# Patient Record
Sex: FEMALE | Race: BLACK OR AFRICAN AMERICAN
[De-identification: names, ages, dates, MRNs, and addresses within clinical notes are randomized per-mention and may not be internally consistent; named-entity substitution may affect disease eponyms.]

---

## 2019-11-11 ENCOUNTER — HOSPITAL ENCOUNTER (EMERGENCY)
Dept: HOSPITAL 74 - JER | Age: 34
Discharge: LEFT BEFORE DISCHARGE | End: 2019-11-11
Payer: SELF-PAY

## 2019-11-11 VITALS — BODY MASS INDEX: 29.8 KG/M2

## 2019-11-11 VITALS — SYSTOLIC BLOOD PRESSURE: 111 MMHG | DIASTOLIC BLOOD PRESSURE: 65 MMHG | HEART RATE: 75 BPM

## 2019-11-11 VITALS — TEMPERATURE: 97.8 F

## 2019-11-11 DIAGNOSIS — Z87.09: ICD-10-CM

## 2019-11-11 DIAGNOSIS — Z3A.01: ICD-10-CM

## 2019-11-11 DIAGNOSIS — R06.02: ICD-10-CM

## 2019-11-11 DIAGNOSIS — O26.891: Primary | ICD-10-CM

## 2019-11-11 LAB
ALBUMIN SERPL-MCNC: 3.7 G/DL (ref 3.4–5)
ALP SERPL-CCNC: 50 U/L (ref 45–117)
ALT SERPL-CCNC: 17 U/L (ref 13–61)
ANION GAP SERPL CALC-SCNC: 6 MMOL/L (ref 8–16)
APPEARANCE UR: CLEAR
AST SERPL-CCNC: 10 U/L (ref 15–37)
BASOPHILS # BLD: 0.7 % (ref 0–2)
BILIRUB SERPL-MCNC: 0.3 MG/DL (ref 0.2–1)
BILIRUB UR STRIP.AUTO-MCNC: NEGATIVE MG/DL
BUN SERPL-MCNC: 4.9 MG/DL (ref 7–18)
CALCIUM SERPL-MCNC: 9.4 MG/DL (ref 8.5–10.1)
CHLORIDE SERPL-SCNC: 103 MMOL/L (ref 98–107)
CO2 SERPL-SCNC: 25 MMOL/L (ref 21–32)
COLOR UR: (no result)
CREAT SERPL-MCNC: 0.6 MG/DL (ref 0.55–1.3)
DEPRECATED RDW RBC AUTO: 13.9 % (ref 11.6–15.6)
EOSINOPHIL # BLD: 1 % (ref 0–4.5)
GLUCOSE SERPL-MCNC: 77 MG/DL (ref 74–106)
HCT VFR BLD CALC: 38.8 % (ref 32.4–45.2)
HGB BLD-MCNC: 12.7 GM/DL (ref 10.7–15.3)
INR BLD: 1.02 (ref 0.83–1.09)
KETONES UR QL STRIP: (no result)
LEUKOCYTE ESTERASE UR QL STRIP.AUTO: NEGATIVE
LYMPHOCYTES # BLD: 38.3 % (ref 8–40)
MCH RBC QN AUTO: 28.4 PG (ref 25.7–33.7)
MCHC RBC AUTO-ENTMCNC: 32.8 G/DL (ref 32–36)
MCV RBC: 86.4 FL (ref 80–96)
MONOCYTES # BLD AUTO: 10.7 % (ref 3.8–10.2)
NEUTROPHILS # BLD: 49.3 % (ref 42.8–82.8)
NITRITE UR QL STRIP: NEGATIVE
PH UR: 6 [PH] (ref 5–8)
PLATELET # BLD AUTO: 297 K/MM3 (ref 134–434)
PMV BLD: 7.7 FL (ref 7.5–11.1)
POTASSIUM SERPLBLD-SCNC: 3.9 MMOL/L (ref 3.5–5.1)
PROT SERPL-MCNC: 7.8 G/DL (ref 6.4–8.2)
PROT UR QL STRIP: (no result)
PROT UR QL STRIP: NEGATIVE
PT PNL PPP: 12 SEC (ref 9.7–13)
RBC # BLD AUTO: 4.49 M/MM3 (ref 3.6–5.2)
SODIUM SERPL-SCNC: 134 MMOL/L (ref 136–145)
SP GR UR: 1.03 (ref 1.01–1.03)
UROBILINOGEN UR STRIP-MCNC: 1 MG/DL (ref 0.2–1)
WBC # BLD AUTO: 3.7 K/MM3 (ref 4–10)

## 2019-11-11 PROCEDURE — 3E0F7GC INTRODUCTION OF OTHER THERAPEUTIC SUBSTANCE INTO RESPIRATORY TRACT, VIA NATURAL OR ARTIFICIAL OPENING: ICD-10-PCS

## 2019-11-11 NOTE — PDOC
Rapid Medical Evaluation


Time Seen by Provider: 19 14:23


Medical Evaluation: 





19 14:23


The patient is a 35 y/o F  who presents for shortness of breath for two 

weeks. Hx of asthma. LMP 19. Recently emigrated from [facility].


Exam: lungs CTAB


Orders: labs, IV, urine


Pt to proceed to the ER for further evaluation





**Discharge Disposition





- Diagnosis


 Shortness of breath








- Referrals





- Patient Instructions





- Post Discharge Activity

## 2019-11-11 NOTE — PDOC
*Physical Exam





- Vital Signs


 Last Vital Signs











Temp Pulse Resp BP Pulse Ox


 


 97.8 F   79   16   106/59 L  100 


 


 11/11/19 14:19  11/11/19 14:19  11/11/19 14:19  11/11/19 14:19  11/11/19 15:55














ED Treatment Course





- LABORATORY


CBC & Chemistry Diagram: 


 11/11/19 16:35





 11/11/19 16:15





- ADDITIONAL ORDERS


Additional order review: 


 Laboratory  Results











  11/11/19 11/11/19 11/11/19





  16:35 16:15 16:05


 


PT with INR   


 


INR   


 


D-Dimer    6643 H


 


Sodium   134 L 


 


Potassium   3.9 


 


Chloride   103 


 


Carbon Dioxide   25 


 


Anion Gap   6 L 


 


BUN   4.9 L 


 


Creatinine   0.6 


 


Est GFR (CKD-EPI)AfAm   136.87 


 


Est GFR (CKD-EPI)NonAf   118.09 


 


Random Glucose   77 


 


Calcium   9.4 


 


Total Bilirubin   0.3 


 


AST   10 L 


 


ALT   17 


 


Alkaline Phosphatase   50 


 


Total Protein   7.8 


 


Albumin   3.7 


 


Beta HCG, Quant   


 


Urine Color   


 


Urine Appearance   


 


Urine pH   


 


Ur Specific Gravity   


 


Urine Protein   


 


Urine Glucose (UA)   


 


Urine Ketones   


 


Urine Blood   


 


Urine Nitrite   


 


Urine Bilirubin   


 


Urine Urobilinogen   


 


Ur Leukocyte Esterase   


 


Blood Type  O POSITIVE  


 


Antibody Screen  Negative  














  11/11/19 11/11/19 11/11/19





  16:05 16:05 16:05


 


PT with INR   12.00 


 


INR   1.02 


 


D-Dimer   


 


Sodium   


 


Potassium   


 


Chloride   


 


Carbon Dioxide   


 


Anion Gap   


 


BUN   


 


Creatinine   


 


Est GFR (CKD-EPI)AfAm   


 


Est GFR (CKD-EPI)NonAf   


 


Random Glucose   


 


Calcium   


 


Total Bilirubin   


 


AST   


 


ALT   


 


Alkaline Phosphatase   


 


Total Protein   


 


Albumin   


 


Beta HCG, Quant    86128.8


 


Urine Color  Dk yellow  


 


Urine Appearance  Clear  


 


Urine pH  6.0  


 


Ur Specific Gravity  1.029  


 


Urine Protein  1+ H  


 


Urine Glucose (UA)  Negative  


 


Urine Ketones  1+ H  


 


Urine Blood  Negative  


 


Urine Nitrite  Negative  


 


Urine Bilirubin  Negative  


 


Urine Urobilinogen  1.0  


 


Ur Leukocyte Esterase  Negative  


 


Blood Type   


 


Antibody Screen   








 











  11/11/19





  16:35


 


RBC  4.49


 


MCV  86.4


 


MCHC  32.8


 


RDW  13.9


 


MPV  7.7


 


Neutrophils %  49.3


 


Lymphocytes %  38.3


 


Monocytes %  10.7 H


 


Eosinophils %  1.0


 


Basophils %  0.7














- Medications


Given in the ED: 


ED Medications














Discontinued Medications














Generic Name Dose Route Start Last Admin





  Trade Name Freq  PRN Reason Stop Dose Admin


 


Albuterol/Ipratropium  1 amp  11/11/19 15:47  11/11/19 16:20





  Duoneb -  NEB  11/11/19 15:48  1 amp





  ONCE ONE   Administration





     





     





     





     














ED Progress Note





- Progress Note


Progress Note: 





11/11/19 20:40


Received patient from nurse practitioner Elie.





Briefly this a 34-year-old woman who has had 4 to 5 days of shortness of 

breath.  Patient recently traveled from [facility] and is currently 7 weeks pregnant.





Patient with a d-dimer of 6643.


Duplex Dopplers of bilateral lower extremities revealed no DVT.





CTA of the chest is pending to rule out pulmonary embolism. 


11/11/19 22:13








Medical Decision Making





- Medical Decision Making





11/11/19 22:13


Consent discussed with patient who refuses to sign over concerns for the child.

  Patient was aware of risks of CAT scan on the child versus pulmonary 

embolism.  Patient is adamant not to receive IV contrast and CAT scan at this 

time.  Case has been discussed with Dr. Miner of OB/GYN.  He recommends 

deferring treatment until patient has a known study.


Admission was discussed with the patient's risk reevaluated by pulmonology and 

so she could think about CTA study and potentially decide to have the scan.  

Patient refuses admission at this time.  Case has been discussed with attending 

physician Harrison who is aware that the patient will leave the hospital AGAINST 

MEDICAL ADVICE.





The patient has requested to leave the ED against medical advice. Pt did not 

want CTA or admission. 





I believe this patient is of sound mind and competent to refuse medical care. 

The patient is responding and asking questions appropriately. The patient is 

oriented to person, place and time. The patient is not psychotic, delusional, 

suicidal, homicidal or hallucinating. The patient demonstrates a normal mental 

capacity to make decisions regarding their healthcare. The patient is 

clinically sober and does not appear to be under the influence of any illicit 

drugs at this time. The patient has been advised of the risks, in layman terms, 

of leaving AMA which include, but are not limited to: cardiac arrest, pulmonary 

infarct, myocardial infarction, arrhythmia, stroke, fetal demise, respiratory 

failure, coma, severe permanent disability, loss of current lifestyle, delay in 

diagnosis and death.





Alternatives have been offered - the patient remains steadfast in their wish to 

leave. The patient has been advised that should they change their mind they are 

welcome to return to this hospital, or any other, at any time. The patient 

understands that in no way does an AMA discharge mean that I do not want them 

to have the best medical care available. To this end, I have provided 

appropriate prescriptions, referrals, and discharge instructions. The patient 

did sign AMA paperwork. The above discussion was witnessed by another member of 

staff, LEOPOLDO Cervantes.





Discharge





- Discharge Information


Problems reviewed: Yes


Clinical Impression/Diagnosis: 


 Shortness of breath





Condition: Fair


Disposition: AGAINST MEDICAL ADVICE





- Admission


No





- Follow up/Referral


Referrals: 


Juan Miner MD [Staff Physician] - 


Vitor Ron MD [Staff Physician] - 





- Patient Discharge Instructions


Additional Instructions: 


You are leaving the hospital AGAINST MEDICAL ADVICE.


The medical plan for you was to have a CAT scan of your chest with IV contrast 

to rule out a blood clot in your lungs.


To see her pregnancy you refused this testing at this time.


Admission was offered you to be evaluated by a pulmonologist. You refused this 

plan also.


Leaving AGAINST MEDICAL ADVICE you accept all risks and responsibilities of 

complications due to shortness of breath and pregnancy.


You been given the names of an OB/GYN and a pulmonologist to follow-up with as 

an outpatient.


If any of your symptoms worsen, you are more than welcome to return to the 

emergency department for continued evaluation.  You are leaving AGAINST MEDICAL 

ADVICE will not be held against you and we would welcome you to return for 

continued evaluation.





- Post Discharge Activity

## 2019-11-11 NOTE — PDOC
History of Present Illness





- General


Chief Complaint: Shortness of Breath


Stated Complaint: SHORTNESS OF BREATH


Time Seen by Provider: 11/11/19 14:23


History Source: Patient


Exam Limitations: No Limitations





- History of Present Illness


Initial Comments: 





11/11/19 15:56


34-year-old female currently 7 weeks pregnant based on LMP presents to ED with 

increasing shortness of breath of the past 2 weeks.  Patient states recent 

travel from [facility] 30 days ago and has not seen a gynecologist or PCP since 

onset.  Patient states has history of asthma but has not been wheezing or 

coughing.  Patient states difficulty taking a deep breath and denies fever, 

chills chest pain or palpitations.  Patient states shortness of breath is 

constant. 


Is this a multiple visit Asthma Patient?: No


Timing/Duration: reports: other


Severity: reports: moderate


Associated Symptoms: reports: shortness of breath





Past History





- Travel


Traveled outside of the country in the last 30 days: No


Close contact w/someone who was outside of country & ill: No





- Past Medical History


Allergies/Adverse Reactions: 


 Allergies











Allergy/AdvReac Type Severity Reaction Status Date / Time


 


No Known Allergies Allergy   Verified 11/11/19 14:26











Asthma: Yes


COPD: No





- Psycho Social/Smoking Cessation Hx


Smoking History: Never smoked


Patient Lives Alone: No


Lives with/in: spouse/SO





Respiratory Specific PMHX





- Complaint Specific PMHX


Hx Asthma: Yes





**Review of Systems





- Review of Systems


Able to Perform ROS?: Yes


Constitutional: No: Symptoms Reported


HEENTM: No: Symptoms Reported


Respiratory: Yes: Shortness of Breath


Cardiac (ROS): No: Symptoms Reported


ABD/GI: No: Symptoms Reported


: No: Symptoms Reported


Musculoskeletal: No: Symptoms Reported


Integumentary: No: Symptoms Reported


Neurological: No: Symptoms reported


Endocrine: No: Symptoms Reported


Hematologic/Lymphatic: No: Symptoms Reported





*Physical Exam





- Vital Signs


 Last Vital Signs











Temp Pulse Resp BP Pulse Ox


 


 97.8 F   79   16   106/59 L  100 


 


 11/11/19 14:19  11/11/19 14:19  11/11/19 14:19  11/11/19 14:19  11/11/19 14:19














- Physical Exam


General Appearance: Yes: Nourished, Appropriately Dressed.  No: Apparent 

Distress


HEENT: negative: Pale Conjunctivae


Neck: positive: Normal Thyroid


Respiratory/Chest: positive: Lungs Clear, Decreased Breath Sounds (At bases 

with inspiration).  negative: Chest Tender, Respiratory Distress, Accessory 

Muscle Use


Cardiovascular: positive: Regular Rhythm, Regular Rate.  negative: Murmur


Extremity: positive: Normal Inspection


Integumentary: positive: Normal Color, Warm, Moist


Neurologic: positive: Motor Strength 5/5 (Ambulatory )





**Heart Score/ECG Review





- ECG Intrepretation


Rhythm: Regular Rhythm (rate 73, normal sinus rhythm.  No ST elevation or 

depression.)





ED Treatment Course





- LABORATORY


CBC & Chemistry Diagram: 


 11/11/19 16:35





 11/11/19 16:15





Medical Decision Making





- Medical Decision Making





11/11/19 15:59


Chief complaint: Shortness of breath consistently for the past 2 weeks.  

Patient with recent travel to [facility] 4 weeks ago and is currently 7 weeks 

pregnant.  Patient denies clotting disorders but states history of asthma.  

Patient does not have complaints of wheezing or cough


Exam: Patient decreased breath sounds at bases with inspiration, vital signs 

stable


Plan: Labs, EKG, urine, and d-dimer


11/11/19 18:03


 Selected Entries











  11/11/19





  14:19


 


Temperature 97.8 F


 


Pulse Rate 79


 


Respiratory 16





Rate 


 


Blood Pressure 106/59 L


 


Blood Pressure 74





Mean 


 


O2 Sat by Pulse 100





Oximetry (%) 


 


Weight 83.915 kg








 Laboratory Tests











  11/11/19 11/11/19 11/11/19





  16:05 16:05 16:05


 


WBC   


 


Hgb   


 


Hct   


 


Absolute Neuts (auto)   


 


Neutrophils %   


 


Monocytes %   


 


PT with INR   12.00 


 


INR   1.02 


 


D-Dimer    6643 H


 


Sodium   


 


Potassium   


 


Chloride   


 


Carbon Dioxide   


 


Anion Gap   


 


BUN   


 


Creatinine   


 


Est GFR (CKD-EPI)AfAm   


 


Est GFR (CKD-EPI)NonAf   


 


Random Glucose   


 


Calcium   


 


Total Bilirubin   


 


AST   


 


ALT   


 


Alkaline Phosphatase   


 


Total Protein   


 


Albumin   


 


Beta HCG, Quant  98162.8  














  11/11/19 11/11/19





  16:15 16:35


 


WBC   3.7 L


 


Hgb   12.7


 


Hct   38.8


 


Absolute Neuts (auto)   1.8


 


Neutrophils %   49.3


 


Monocytes %   10.7 H


 


PT with INR  


 


INR  


 


D-Dimer  


 


Sodium  134 L 


 


Potassium  3.9 


 


Chloride  103 


 


Carbon Dioxide  25 


 


Anion Gap  6 L 


 


BUN  4.9 L 


 


Creatinine  0.6 


 


Est GFR (CKD-EPI)AfAm  136.87 


 


Est GFR (CKD-EPI)NonAf  118.09 


 


Random Glucose  77 


 


Calcium  9.4 


 


Total Bilirubin  0.3 


 


AST  10 L 


 


ALT  17 


 


Alkaline Phosphatase  50 


 


Total Protein  7.8 


 


Albumin  3.7 


 


Beta HCG, Quant  








 Patient concerning for PE.  Patient ordered for duplex. If positive may start 

empirical treatment with heparin. 


11/11/19 18:16








Discharge





- Discharge Information


Problems reviewed: Yes


Clinical Impression/Diagnosis: 


 Shortness of breath





Condition: Fair


Disposition: AGAINST MEDICAL ADVICE





- Follow up/Referral


Referrals: 


Vitor Ron MD [Staff Physician] - 


Juan Miner MD [Staff Physician] - 





- Patient Discharge Instructions


Additional Instructions: 


You are leaving the hospital AGAINST MEDICAL ADVICE.


The medical plan for you was to have a CAT scan of your chest with IV contrast 

to rule out a blood clot in your lungs.


To see her pregnancy you refused this testing at this time.


Admission was offered you to be evaluated by a pulmonologist. You refused this 

plan also.


Leaving AGAINST MEDICAL ADVICE you accept all risks and responsibilities of 

complications due to shortness of breath and pregnancy.


You been given the names of an OB/GYN and a pulmonologist to follow-up with as 

an outpatient.


If any of your symptoms worsen, you are more than welcome to return to the 

emergency department for continued evaluation.  You are leaving AGAINST MEDICAL 

ADVICE will not be held against you and we would welcome you to return for 

continued evaluation.





- Post Discharge Activity

## 2020-07-09 ENCOUNTER — HOSPITAL ENCOUNTER (INPATIENT)
Dept: HOSPITAL 74 - JLDR | Age: 35
LOS: 5 days | Discharge: HOME | DRG: 540 | End: 2020-07-14
Attending: OBSTETRICS & GYNECOLOGY | Admitting: OBSTETRICS & GYNECOLOGY
Payer: COMMERCIAL

## 2020-07-09 VITALS — BODY MASS INDEX: 34.3 KG/M2

## 2020-07-09 DIAGNOSIS — O48.0: Primary | ICD-10-CM

## 2020-07-09 DIAGNOSIS — O34.211: ICD-10-CM

## 2020-07-09 DIAGNOSIS — Z3A.41: ICD-10-CM

## 2020-07-09 DIAGNOSIS — O61.0: ICD-10-CM

## 2020-07-09 LAB
ANION GAP SERPL CALC-SCNC: 9 MMOL/L (ref 8–16)
APTT BLD: 27 SECONDS (ref 25.2–36.5)
BASOPHILS # BLD: 0.3 % (ref 0–2)
BUN SERPL-MCNC: 4.1 MG/DL (ref 7–18)
CALCIUM SERPL-MCNC: 8.6 MG/DL (ref 8.5–10.1)
CHLORIDE SERPL-SCNC: 106 MMOL/L (ref 98–107)
CO2 SERPL-SCNC: 23 MMOL/L (ref 21–32)
CREAT SERPL-MCNC: 0.5 MG/DL (ref 0.55–1.3)
DEPRECATED RDW RBC AUTO: 14.1 % (ref 11.6–15.6)
EOSINOPHIL # BLD: 1 % (ref 0–4.5)
GLUCOSE SERPL-MCNC: 94 MG/DL (ref 74–106)
HCT VFR BLD CALC: 35.6 % (ref 32.4–45.2)
HGB BLD-MCNC: 11.8 GM/DL (ref 10.7–15.3)
INR BLD: 0.94 (ref 0.83–1.09)
LYMPHOCYTES # BLD: 32.3 % (ref 8–40)
MCH RBC QN AUTO: 30.2 PG (ref 25.7–33.7)
MCHC RBC AUTO-ENTMCNC: 33.3 G/DL (ref 32–36)
MCV RBC: 90.9 FL (ref 80–96)
MONOCYTES # BLD AUTO: 9.2 % (ref 3.8–10.2)
NEUTROPHILS # BLD: 57.2 % (ref 42.8–82.8)
PLATELET # BLD AUTO: 265 K/MM3 (ref 134–434)
PMV BLD: 8.3 FL (ref 7.5–11.1)
POTASSIUM SERPLBLD-SCNC: 3.4 MMOL/L (ref 3.5–5.1)
PT PNL PPP: 11.1 SEC (ref 9.7–13)
RBC # BLD AUTO: 3.92 M/MM3 (ref 3.6–5.2)
SODIUM SERPL-SCNC: 137 MMOL/L (ref 136–145)
WBC # BLD AUTO: 4.9 K/MM3 (ref 4–10)

## 2020-07-09 PROCEDURE — U0003 INFECTIOUS AGENT DETECTION BY NUCLEIC ACID (DNA OR RNA); SEVERE ACUTE RESPIRATORY SYNDROME CORONAVIRUS 2 (SARS-COV-2) (CORONAVIRUS DISEASE [COVID-19]), AMPLIFIED PROBE TECHNIQUE, MAKING USE OF HIGH THROUGHPUT TECHNOLOGIES AS DESCRIBED BY CMS-2020-01-R: HCPCS

## 2020-07-09 PROCEDURE — 3E0P7VZ INTRODUCTION OF HORMONE INTO FEMALE REPRODUCTIVE, VIA NATURAL OR ARTIFICIAL OPENING: ICD-10-PCS | Performed by: OBSTETRICS & GYNECOLOGY

## 2020-07-09 RX ADMIN — SODIUM CHLORIDE, SODIUM LACTATE, POTASSIUM CHLORIDE, CALCIUM CHLORIDE AND DEXTROSE MONOHYDRATE SCH MLS/HR: 5; 600; 310; 30; 20 INJECTION, SOLUTION INTRAVENOUS at 22:00

## 2020-07-09 NOTE — HP
Past Medical History





- Admission


Chief Complaint: Elective induction


History of Present Illness: 





36 yo  @ 41 weeks gestation, EDC 20, admitted for cervidil induction.


She has no complaints.


History Source: Patient


Limitations to Obtaining History: No Limitations





- Past Medical History


...: 2


...Para: 1


...EDC by Renetta: 20





- Past Surgical History


Past Surgical History: Yes: None


Hx Myomectomy: No


Hx Transabdominal Cerclage: No





- Smoking History


Smoking history: Never smoked





- Alcohol/Substance Use


Hx Alcohol Use: No


History of Substance Use: reports: None





- Social History


Usual Living Arrangement: Yes: With Spouse


History of Recent Travel: No





Home Medications





- Allergies


Allergies/Adverse Reactions: 


                                    Allergies











Allergy/AdvReac Type Severity Reaction Status Date / Time


 


No Known Allergies Allergy   Verified 20 21:21














- Home Medications


Home Medications: 


Ambulatory Orders





Ferrous Sulfate [Feosol] 325 mg PO DAILY 20 


Mv-Mn/Iron/FA/Herbal/Digestive [Prenatal One Tablet] 1 each PO DAILY 20 











Family Medical History


Family History: Unremarkable





Review of Systems





- Review of Systems


Constitutional: reports: No Symptoms


Eyes: reports: No Symptoms


HENT: reports: No Symptoms


Neck: reports: No Symptoms


Cardiovascular: reports: No Symptoms


Respiratory: reports: No Symptoms


Gastrointestinal: reports: No Symptoms


Genitourinary: reports: No Symptoms


Breasts: reports: No Symptoms Reported


Musculoskeletal: reports: No Symptoms


Integumentary: reports: No Symptoms


Neurological: reports: No Symptoms


Pain Intensity: 0





Physical Exam - Maternity


Constitutional: Yes: No Distress


Eyes: Yes: Conjunctiva Clear


HENT: Yes: Atraumatic


Neck: Yes: Supple


Cardiovascular: Yes: Regular Rate and Rhythm


Lungs: Clear to auscultation


Breast(s): Yes: WNL





- Abdominal Exam/OB


Number of Fetuses: Single


Fetal Presentation: Vertex


Contractions: No





- Vaginal Exam/OB


Vaginal Bleeding: No





- Physical Exam


Musculoskeletal: Yes: WNL


Extremities: Yes: WNL


...Motor Strength: WNL


Psychiatric: Yes: Alert, Oriented





Problem List





- Problems


(1) Post-dates pregnancy


Problems reviewed: Yes   


Code(s): O48.0 - POST-TERM PREGNANCY   


Qualifiers: 


   Post-term pregnancy type: 40-42 weeks gestation   Qualified Code(s): O48.0 - 

Post-term pregnancy   





Assessment/Plan





Postdates pregnancy


Admit for cervidil induction

## 2020-07-09 NOTE — PN
Progress Note (short form)





- Note


Progress Note: 


36 yo , EGA 41 weeks for induction of labor


VSS, afebrile


EFM - 140/min, moderate variability, accelerations, no decelerations


         [place]os - irritability


Pelvic - Closed/long/posterior/vertex


Plan - Late term gestation for cervidil ripening


         Cervidil placed


         Anticipate vaginal delivery

## 2020-07-10 PROCEDURE — 3E033VJ INTRODUCTION OF OTHER HORMONE INTO PERIPHERAL VEIN, PERCUTANEOUS APPROACH: ICD-10-PCS | Performed by: OBSTETRICS & GYNECOLOGY

## 2020-07-10 RX ADMIN — SODIUM CHLORIDE, SODIUM LACTATE, POTASSIUM CHLORIDE, CALCIUM CHLORIDE AND DEXTROSE MONOHYDRATE SCH MLS/HR: 5; 600; 310; 30; 20 INJECTION, SOLUTION INTRAVENOUS at 20:00

## 2020-07-10 RX ADMIN — SODIUM CHLORIDE, SODIUM LACTATE, POTASSIUM CHLORIDE, CALCIUM CHLORIDE AND DEXTROSE MONOHYDRATE SCH MLS/HR: 5; 600; 310; 30; 20 INJECTION, SOLUTION INTRAVENOUS at 06:00

## 2020-07-10 RX ADMIN — SODIUM CHLORIDE, SODIUM LACTATE, POTASSIUM CHLORIDE, CALCIUM CHLORIDE AND DEXTROSE MONOHYDRATE SCH MLS/HR: 5; 600; 310; 30; 20 INJECTION, SOLUTION INTRAVENOUS at 12:17

## 2020-07-10 RX ADMIN — Medication SCH MLS/HR: at 11:00

## 2020-07-11 LAB
BASE EXCESS BLDCOA CALC-SCNC: -3 MMOL/L (ref 0–2)
HCO3 BLDCO-SCNC: 24.1 MMHG (ref 20–29)
PCO2 BLDCO: 50.9 MMHG (ref 30–78)
PH BLDCO: 7.29 [PH] (ref 7.14–7.44)

## 2020-07-11 RX ADMIN — BUPIVACAINE HYDROCHLORIDE SCH ML: 7.5 INJECTION, SOLUTION EPIDURAL; RETROBULBAR at 05:20

## 2020-07-11 RX ADMIN — Medication SCH MLS/HR: at 10:45

## 2020-07-11 RX ADMIN — SODIUM CHLORIDE, SODIUM GLUCONATE, SODIUM ACETATE, POTASSIUM CHLORIDE, AND MAGNESIUM CHLORIDE SCH MLS/HR: 526; 502; 368; 37; 30 INJECTION, SOLUTION INTRAVENOUS at 05:00

## 2020-07-11 NOTE — PN
Progress Note (short form)





- Note


Progress Note: 





Patient seen and evaluated, she's lying comfortably in bed.


She's status post epidural anesthesia.





FHR : 150's, decrease variability, occasional variable decelerations.


[place] : Contractions absent


VE :  Cervix 4-5/90/-1


        


       





A/P : 


       Postdates pregnancy


       Failed medical induction


       Non-reassuring fetal heart rate


       Consent signed for primary 


       Anesthesiologist called





Problem List





- Problems


(1) Post-dates pregnancy


Code(s): O48.0 - POST-TERM PREGNANCY   


Qualifiers: 


   Post-term pregnancy type: 40-42 weeks gestation   Qualified Code(s): O48.0 - 

Post-term pregnancy

## 2020-07-11 NOTE — OP
Operative Note





- Note:


Operative Date: 20


Pre-Operative Diagnosis: Nonreassuring Fetal Heart Rate / Failed Medical 

Induction


Operation: Primary Low Transverse 


Findings: 





Baby girl in LOT position


Post-Operative Diagnosis: Same as Pre-op


Surgeon: Jessica Rowe


Assistant: Elkin Arguello


Anesthesia: Epidural


Specimens Removed: Placenta


Estimated Blood Loss (mls): 700

## 2020-07-11 NOTE — PN
Progress Note (short form)





- Note


Progress Note: 





I assisted abril Barrientos at the c/section for the entirety of the case.

## 2020-07-12 LAB
BASOPHILS # BLD: 0.3 % (ref 0–2)
DEPRECATED RDW RBC AUTO: 14.2 % (ref 11.6–15.6)
EOSINOPHIL # BLD: 0.2 % (ref 0–4.5)
HCT VFR BLD CALC: 33.4 % (ref 32.4–45.2)
HGB BLD-MCNC: 11 GM/DL (ref 10.7–15.3)
LYMPHOCYTES # BLD: 10.3 % (ref 8–40)
MCH RBC QN AUTO: 29.9 PG (ref 25.7–33.7)
MCHC RBC AUTO-ENTMCNC: 32.9 G/DL (ref 32–36)
MCV RBC: 90.7 FL (ref 80–96)
MONOCYTES # BLD AUTO: 5.9 % (ref 3.8–10.2)
NEUTROPHILS # BLD: 83.3 % (ref 42.8–82.8)
PLATELET # BLD AUTO: 240 K/MM3 (ref 134–434)
PMV BLD: 8 FL (ref 7.5–11.1)
RBC # BLD AUTO: 3.69 M/MM3 (ref 3.6–5.2)
WBC # BLD AUTO: 12.8 K/MM3 (ref 4–10)

## 2020-07-12 RX ADMIN — IBUPROFEN PRN MG: 600 TABLET, FILM COATED ORAL at 17:11

## 2020-07-12 RX ADMIN — SIMETHICONE CHEW TAB 80 MG PRN MG: 80 TABLET ORAL at 17:12

## 2020-07-12 RX ADMIN — SODIUM CHLORIDE, SODIUM LACTATE, POTASSIUM CHLORIDE, CALCIUM CHLORIDE AND DEXTROSE MONOHYDRATE SCH: 5; 600; 310; 30; 20 INJECTION, SOLUTION INTRAVENOUS at 21:03

## 2020-07-12 NOTE — PN
Post Partum Note





- Post Partum


Date of Delivery: 20


Post Partum Day: 1


Vital Signs: 


                               Vital Signs - 24 hr











  20





  00:00 01:00 02:00


 


Temperature   99.0 F


 


Pulse Rate   94 H


 


Respiratory 18 18 18





Rate   


 


Blood Pressure   105/59 L














  20





  03:00 04:00 06:00


 


Temperature   98.7 F


 


Pulse Rate   75


 


Respiratory 18 18 18





Rate   


 


Blood Pressure   99/55 L














  20





  08:00 10:00 21:35


 


Temperature  98.5 F 98.6 F


 


Pulse Rate  67 76


 


Respiratory 20 20 18





Rate   


 


Blood Pressure  107/68 110/67











Labs: 


                         Laboratory Results - last 24 hr











  20





  07:40


 


WBC  12.8 H


 


RBC  3.69


 


Hgb  11.0


 


Hct  33.4


 


MCV  90.7


 


MCH  29.9


 


MCHC  32.9


 


RDW  14.2


 


Plt Count  240


 


MPV  8.0


 


Absolute Neuts (auto)  10.7 H


 


Neutrophils %  83.3 H D


 


Lymphocytes %  10.3  D


 


Monocytes %  5.9


 


Eosinophils %  0.2


 


Basophils %  0.3


 


Nucleated RBC %  0














- Subjective


Subjective: No Complaints





- Objective


Afebrile: Yes


Breast: Not engorged


Abdomen: Soft, Non-tender, Other (+ dressing dry)


Uterus: Fundus firm


Vagina: Scant lochia


Extremities: Non-tender





- Assessment/Plan


(1) History of low transverse  section


Assessment: Other (POD1)   Plan: Routine Care

## 2020-07-13 RX ADMIN — Medication SCH: at 18:56

## 2020-07-13 RX ADMIN — SODIUM CHLORIDE, SODIUM GLUCONATE, SODIUM ACETATE, POTASSIUM CHLORIDE, AND MAGNESIUM CHLORIDE SCH: 526; 502; 368; 37; 30 INJECTION, SOLUTION INTRAVENOUS at 18:56

## 2020-07-13 RX ADMIN — Medication SCH: at 19:26

## 2020-07-13 RX ADMIN — BUPIVACAINE HYDROCHLORIDE SCH: 7.5 INJECTION, SOLUTION EPIDURAL; RETROBULBAR at 18:56

## 2020-07-13 RX ADMIN — SIMETHICONE CHEW TAB 80 MG PRN MG: 80 TABLET ORAL at 13:37

## 2020-07-13 RX ADMIN — IBUPROFEN PRN MG: 600 TABLET, FILM COATED ORAL at 05:52

## 2020-07-13 RX ADMIN — SIMETHICONE CHEW TAB 80 MG PRN MG: 80 TABLET ORAL at 20:23

## 2020-07-13 RX ADMIN — SIMETHICONE CHEW TAB 80 MG PRN MG: 80 TABLET ORAL at 05:53

## 2020-07-13 RX ADMIN — Medication SCH: at 19:27

## 2020-07-13 RX ADMIN — BUPIVACAINE HYDROCHLORIDE SCH: 7.5 INJECTION, SOLUTION EPIDURAL; RETROBULBAR at 15:40

## 2020-07-13 RX ADMIN — Medication SCH: at 15:40

## 2020-07-13 RX ADMIN — IBUPROFEN PRN MG: 600 TABLET, FILM COATED ORAL at 20:22

## 2020-07-13 RX ADMIN — SODIUM CHLORIDE, SODIUM LACTATE, POTASSIUM CHLORIDE, CALCIUM CHLORIDE AND DEXTROSE MONOHYDRATE SCH: 5; 600; 310; 30; 20 INJECTION, SOLUTION INTRAVENOUS at 18:56

## 2020-07-13 RX ADMIN — IBUPROFEN PRN MG: 600 TABLET, FILM COATED ORAL at 13:37

## 2020-07-13 RX ADMIN — SODIUM CHLORIDE, SODIUM GLUCONATE, SODIUM ACETATE, POTASSIUM CHLORIDE, AND MAGNESIUM CHLORIDE SCH: 526; 502; 368; 37; 30 INJECTION, SOLUTION INTRAVENOUS at 15:40

## 2020-07-13 NOTE — PN
Post Partum Note





- Post Partum


Date of Delivery: 20


Post Partum Day: 2


Vital Signs: 


                               Vital Signs - 24 hr











  20





  10:00 21:35


 


Temperature 98.5 F 98.6 F


 


Pulse Rate 67 76


 


Respiratory 20 18





Rate  


 


Blood Pressure 107/68 110/67














- Subjective


Subjective: No Complaints





- Objective


Afebrile: Yes


Breast: Not engorged


Abdomen: Soft, Non-tender


Uterus: Fundus firm


Vagina: Scant lochia


Extremities: Non-tender





- Assessment/Plan


(1) History of low transverse  section


Assessment: Other (SP CS POD2)   Plan: Routine Care

## 2020-07-13 NOTE — OP
DATE OF OPERATION:  2020

 

PREOPERATIVE DIAGNOSIS:  Nonreassuring fetal heart rate, failed medical induction at

41 weeks' gestation.

 

POSTOPERATIVE DIAGNOSIS:  Nonreassuring fetal heart rate, failed medical induction at

41 weeks' gestation.

 

PROCEDURE:  Primary low transverse  section.

 

SURGEON:  Jessica Rowe MD

 

ASSISTANT:  Elkin Arguello MD 

 

ANESTHESIA:  Epidural.  

 

COMPLICATIONS:  None.

 

ESTIMATED BLOOD LOSS:  700 mL.  

 

DESCRIPTION OF PROCEDURE:  Patient was taken to the operating room where epidural

anesthesia was found to be adequate.  Patient was then prepped and draped in proper

sterile fashion.  A Pfannenstiel skin incision was then made and carried down through

the underlying layer of fascia.  The fascia was incised in the midline and extended

laterally.  The superior aspect of the fascial incision was then grasped with a

Kocher clamp, elevated, and the rectus muscle dissected off bluntly.  Attention was

then turned to the inferior aspect of the fascial incision, which in a similar

fashion was then grasped with a Kocher clamp, elevated, and the rectus muscle

dissected off bluntly.  The rectus muscle was then  in the midline, the

peritoneum identified and entered sharply with the Metzenbaum scissors.  The

peritoneal incision was extended superiorly and inferiorly, with good visualization

of the bladder, the vesicouterine peritoneum was then entered using the Metzenbaum

scissors.  This incision was extended laterally and a bladder flap created digitally.

 The bladder blade was inserted, and the lower uterine segment was incised using a

10-blade.  This incision was extended laterally and the fetal head delivered

atraumatically.  Nose and mouth were suctioned and the cord clamped and cut.  The

infant was handed to the waiting neonatologist.  The placenta was removed manually. 

The uterus was exteriorized and cleared of all clots and debris.  The uterine

incision was repaired using 0 Biosyn in a running locked fashion.  A second layer of

the same suture was used as a means to provide excellent hemostasis, and the pelvis

was then completely irrigated.  The uterus was returned to the abdomen.  Then the

peritoneum was closed using 2-0 Biosyn.  The fascia was reapproximated using 0 Vicryl

in a running fashion, and the skin was closed in a subcuticular fashion using 3-0

Vicryl.  Patient tolerated the procedure well.  Patient was then taken to PACU in

stable condition.  

 

PATHOLOGY:  Placenta.  

 

 

JESSICA ROWE M.D.

 

[id number]

DD: 2020 15:39

DT: 2020 21:50

Job #:  [id number]

## 2020-07-14 VITALS — SYSTOLIC BLOOD PRESSURE: 106 MMHG | TEMPERATURE: 97.9 F | DIASTOLIC BLOOD PRESSURE: 69 MMHG | HEART RATE: 77 BPM

## 2020-07-14 LAB
BASOPHILS # BLD: 0.3 % (ref 0–2)
DEPRECATED RDW RBC AUTO: 14 % (ref 11.6–15.6)
EOSINOPHIL # BLD: 2.5 % (ref 0–4.5)
HCT VFR BLD CALC: 31.7 % (ref 32.4–45.2)
HGB BLD-MCNC: 10.7 GM/DL (ref 10.7–15.3)
LYMPHOCYTES # BLD: 26.2 % (ref 8–40)
MCH RBC QN AUTO: 30.8 PG (ref 25.7–33.7)
MCHC RBC AUTO-ENTMCNC: 33.9 G/DL (ref 32–36)
MCV RBC: 90.9 FL (ref 80–96)
MONOCYTES # BLD AUTO: 5.3 % (ref 3.8–10.2)
NEUTROPHILS # BLD: 65.7 % (ref 42.8–82.8)
PLATELET # BLD AUTO: 289 K/MM3 (ref 134–434)
PMV BLD: 7.9 FL (ref 7.5–11.1)
RBC # BLD AUTO: 3.49 M/MM3 (ref 3.6–5.2)
WBC # BLD AUTO: 5.8 K/MM3 (ref 4–10)

## 2020-07-14 NOTE — DS
Physical Exam-GYN


Vital Signs: 


                                   Vital Signs











Temperature  97.9 F   20 09:04


 


Pulse Rate  77   20 09:04


 


Respiratory Rate  20   20 09:04


 


Blood Pressure  106/69   20 09:04


 


O2 Sat by Pulse Oximetry (%)  100   20 11:40











Constitutional: Yes: Well Nourished


Eyes: Yes: Conjunctiva Clear


HENT: Yes: Atraumatic


Neck: Yes: Supple


Cardiovascular: Yes: Regular Rate and Rhythm


Respiratory: Yes: Regular


Gastrointestinal: Yes: Normal Bowel Sounds


External Genitalia: Yes: Normal


Uterus: Yes: Firm


Wound/Incision: Yes: Clean/Dry, Well Approximated, Steri Strips (in place)


Neurological: Yes: Alert, Oriented


...Motor Strength: WNL


Psychiatric: Yes: Alert, Oriented


Labs: 


                                    CBC, BMP





                                 20 06:51 





                                 20 20:35 











Delivery





- Delivery


Type of Anesthesia: Spinal


Episiotomy/Laceration: None


EBL (cc): 700





Delivery, Single Birth





- Stages of Labor


Date of Delivery: 20


Time of Delivery: 09:43


Time Placenta Delivered: 09:44





- Condition of Infant


Pediatrician/Neonatologist Present: Yes


Name: Ciarra Mccallum


Infant Gender: Female


Birth Weight: 7 lb 9 oz


Position: Left, OT


Total Hours ROM (Hrs/Mins): 88u4knp





- Apgar


  ** 1 Minute


Apgar Total Score: 9





  ** 5 Minutes


Apgar Total Score: 10





-  Feeding Plan


Initial Plan: Exclusive breastfeeding throughout hospitalization





Discharge Summary


Problems reviewed: Yes


Reason For Visit: INDUCTION


Current Active Problems





History of low transverse  section (Acute)


Post-dates pregnancy (Acute)








Procedures: Principal: Primary Low Transverse 


Hospital Course: 


Routine post op care


Health Concerns: 


None


Plan of Treatment: 


Ambulation


Analgesia 


F/U with MD in 1 week


Goals: 


Resume regular activities in 4-6 weeks


Condition: Good





- Instructions


Diet, Activity, Other Instructions: 


Regular diet


No driving, no lifting x 4 weeks


F/U with MD in 1 week


Disposition: HOME





- Home Medications


Comprehensive Discharge Medication List: 


Ambulatory Orders





Ferrous Sulfate [Feosol] 325 mg PO DAILY 20 


Mv-Mn/Iron/FA/Herbal/Digestive [Prenatal One Tablet] 1 each PO DAILY 20

## 2020-07-16 NOTE — PATH
Surgical Pathology Report



Patient Name:  JAMES PINEDA

Accession #:  [id number]

Med. Rec. #:  [id number]                                                        

   /Age/Gender:  1985 (Age: 35) / F

Account:  [id number]                                                          

             Location: [facility] OBS/GYN

Taken:  2020

Received:  2020

Reported:  2020

Physicians:  Jessica Rowe M.D.

  



Specimen(s) Received

 PLACENTA 





Clinical History

 at 41.2 weeks, primary , failed induction, nonreassuring fetal

heart rate







Final Diagnosis

PLACENTA,  SECTION:

463 G THIRD TRIMESTER PLACENTA WITH TRIVASCULAR UMBILICAL CORD AND UNREMARKABLE

PLACENTAL MEMBRANES.





***Electronically Signed***

Anastasia Corey M.D.





Gross Description

The specimen is received fresh labeled placenta and is a 463 gram, 18.0 x 15.0 x

2.8 cm. placenta with attached membranes and umbilical cord.  The attached

membranes are tan, translucent with focal opacities and insert marginally.  The

umbilical cord measures 20 cm. in length and averages 1.1 cm. in diameter.  The

cord inserts eccentrically, 5.5 cm. to the nearest margin.  No true knots or

strictures are identified. Cut surface of the umbilical cord reveals 3 vessels.

The fetal surface is grey-blue with minimal fibrin deposition and appropriate

caliber vessels.  The maternal surface is red-brown with focal defects. 

Sectioning reveals red-brown, spongy parenchyma.  No lesions are identified. 

Representative sections are submitted in three cassettes as follows: 1- membrane

rolls and umbilical cord; 2-3- full thickness sections of placenta.

/7/15/2020



[facility]/7/15/2020

## 2020-08-31 ENCOUNTER — HOSPITAL ENCOUNTER (EMERGENCY)
Dept: HOSPITAL 74 - JER | Age: 35
Discharge: HOME | End: 2020-08-31
Payer: COMMERCIAL

## 2020-08-31 VITALS — SYSTOLIC BLOOD PRESSURE: 118 MMHG | HEART RATE: 76 BPM | DIASTOLIC BLOOD PRESSURE: 73 MMHG

## 2020-08-31 VITALS — BODY MASS INDEX: 31.3 KG/M2

## 2020-08-31 VITALS — TEMPERATURE: 98.2 F

## 2020-08-31 DIAGNOSIS — M79.661: Primary | ICD-10-CM

## 2020-08-31 LAB
ALBUMIN SERPL-MCNC: 3.8 G/DL (ref 3.4–5)
ALP SERPL-CCNC: 81 U/L (ref 45–117)
ALT SERPL-CCNC: 23 U/L (ref 13–61)
ANION GAP SERPL CALC-SCNC: 6 MMOL/L (ref 8–16)
APTT BLD: 33.1 SECONDS (ref 25.2–36.5)
AST SERPL-CCNC: 11 U/L (ref 15–37)
BASOPHILS # BLD: 0.7 % (ref 0–2)
BILIRUB SERPL-MCNC: 0.3 MG/DL (ref 0.2–1)
BUN SERPL-MCNC: 11.5 MG/DL (ref 7–18)
CALCIUM SERPL-MCNC: 9.8 MG/DL (ref 8.5–10.1)
CHLORIDE SERPL-SCNC: 105 MMOL/L (ref 98–107)
CO2 SERPL-SCNC: 27 MMOL/L (ref 21–32)
CREAT SERPL-MCNC: 0.7 MG/DL (ref 0.55–1.3)
DEPRECATED RDW RBC AUTO: 12.9 % (ref 11.6–15.6)
EOSINOPHIL # BLD: 1.8 % (ref 0–4.5)
GLUCOSE SERPL-MCNC: 83 MG/DL (ref 74–106)
HCT VFR BLD CALC: 38.8 % (ref 32.4–45.2)
HGB BLD-MCNC: 12.6 GM/DL (ref 10.7–15.3)
INR BLD: 1.01 (ref 0.83–1.09)
LYMPHOCYTES # BLD: 40.8 % (ref 8–40)
MCH RBC QN AUTO: 28.8 PG (ref 25.7–33.7)
MCHC RBC AUTO-ENTMCNC: 32.5 G/DL (ref 32–36)
MCV RBC: 88.6 FL (ref 80–96)
MONOCYTES # BLD AUTO: 6.9 % (ref 3.8–10.2)
NEUTROPHILS # BLD: 49.8 % (ref 42.8–82.8)
PLATELET # BLD AUTO: 300 K/MM3 (ref 134–434)
PMV BLD: 7.4 FL (ref 7.5–11.1)
POTASSIUM SERPLBLD-SCNC: 4.2 MMOL/L (ref 3.5–5.1)
PROT SERPL-MCNC: 8.2 G/DL (ref 6.4–8.2)
PT PNL PPP: 11.9 SEC (ref 9.7–13)
RBC # BLD AUTO: 4.39 M/MM3 (ref 3.6–5.2)
SODIUM SERPL-SCNC: 139 MMOL/L (ref 136–145)
WBC # BLD AUTO: 3.7 K/MM3 (ref 4–10)

## 2020-08-31 NOTE — PDOC
History of Present Illness





- General


Chief Complaint: Muscle Cramping


Stated Complaint: SENT BY PCP / BLOOD WORK


Time Seen by Provider: 20 12:11


History Source: Patient


Exam Limitations: Clinical Condition





- History of Present Illness


Initial Comments: 





20 12:38


Patient with past medical history of asthma and 7 weeks postpartum  

presented with complaint of persistent pain to posterior right popliteal and 

distal thigh area which is worse when she sitting down.  Patient went to see PCP

for the first time to initiate care and was sent by PCP Dr. Sofie Yu to rule 

out DVT and do blood work.  Patient denies any shortness of breath now.  Patient

reported has been having persistent asthma symptoms throughout her pregnancy 

which she has improved after delivery 6 weeks ago.  Denies shortness of breath 

now.  Denies chest pain, palpitations, numbness or tingling sensation, nausea, 

vomiting, dizziness, abdominal pain.  Denies any other symptoms.  Patient has 

not taken anything for symptoms


Is this a multiple visit Asthma Patient?: No


Timing/Duration: 1 week





Past History





- Medical History


Allergies/Adverse Reactions: 


                                    Allergies











Allergy/AdvReac Type Severity Reaction Status Date / Time


 


No Known Drug Allergies Allergy   Verified 20 11:56


 


spices Allergy Mild Difficulty Uncoded 20 11:56





   Breathing  











Home Medications: 


Ambulatory Orders





Ferrous Sulfate [Feosol] 325 mg PO DAILY 20 


Mv-Mn/Iron/FA/Herbal/Digestive [Prenatal One Tablet] 1 each PO DAILY 20 


Ibuprofen 800 mg PO Q8H PRN #20 tablet 20 


Methocarbamol [Robaxin -] 500 mg PO BID PRN #14 tablet 20 








Asthma: Yes


Cancer: No


Cardiac Disorders: No


COPD: No


Diabetes: No


HTN: No


Seizures: No


Thyroid Disease: No





- Reproductive History


Is Patient Pregnant Now?: No





- Immunization History


Immunization Up to Date: Yes





- Psycho-Social/Smoking History


Smoking History: Never smoked





- Substance Abuse Hx (Audit-C & DAST Scrn)


How often the patient has a drink containing alcohol: Never


Score: In Men: 4 or > Positive; In Women: 3 or > Positive: 0


Screen Result (Pos requires Nsg. Audit-10AR): Negative


In the last yr the pt used illegal drug/Rx for NonMed reason: No


Score:  Yes response is considered Positive: 0


Screen Result (Positive result requires Nsg. DAST-10): Negative





**Review of Systems





- Review of Systems


Able to Perform ROS?: Yes


Is the patient limited English proficient: No


Constitutional: No: Chills, Fever, Malaise


HEENTM: No: Symptoms Reported, See HPI, Eye Pain, Blurred Vision, Tearing, 

Recent change in vision, Double Vision, Cataracts, Ear Pain, Ocular Prothesis, 

Ear Discharge, Nose Pain, Nose Congestion, Tinnitus, Nose Bleeding, Hearing 

Loss, Throat Pain, Throat Swelling, Mouth Pain, Dental Problems, Difficulty 

Swallowing, Mouth Swelling, Other


Respiratory: No: Symptoms reported, See HPI, Cough, Orthopnea, Shortness of 

Breath, SOB with Exertion, SOB at Rest, Stridor, Wheezing, Productive cough, 

Hemoptysis, Other


Cardiac (ROS): No: Symptoms Reported, See HPI, Chest Pain, Edema, Irregular 

Heart Rate, Lightheadedness, Palpitations, Syncope, Chest Tightness, Other


ABD/GI: No: Symptoms Reported, See HPI, Nausea, Vomiting, Abdominal cramping


: No: Symptoms Reported


Musculoskeletal: Yes: Symptoms Reported, See HPI, Joint Pain (posterior right 

knee), Muscle Pain (posterior right thigh)


Integumentary: No: Symptoms Reported


Neurological: No: Symptoms reported, Headache, Numbness, Paresthesia, Weakness, 

Dizziness


All Other Systems: Reviewed and Negative





*Physical Exam





- Vital Signs


                                Last Vital Signs











Temp Pulse Resp BP Pulse Ox


 


 98.2 F   80   20   104/58 L  100 


 


 20 11:57  20 11:57  20 11:57  20 11:57  20 11:57














- Physical Exam





20 12:42


GENERAL:


Well developed, well nourished. Awake and alert. No acute distress.


CARDIOVASCULAR:


Regular rate and rhythm. No murmurs, rubs, or gallops. 


PULMONARY: 


No evidence of respiratory distress. Lungs clear to auscultation bilaterally. No

wheezing, rales or rhonchi.


MUSCULOSKELETAL : Moderate tenderness over popliteal and distal posterior right 

thigh. No bony deformities.  Negative Homans sign


EXTREMITIES: 


No cyanosis. No clubbing. No edema. No calf tenderness.  Negative Homans sign


SKIN: 


Warm and dry. Normal capillary refill. No rashes. No jaundice. 


NEUROLOGICAL: 


Alert, awake, appropriate.  No motor deficits in the  lower extremities.  Gait 

is normal without ataxia.


PSYCHIATRIC: 


Cooperative. Good eye contact. Appropriate mood and affect.


General Appearance: Yes: Nourished, Appropriately Dressed.  No: Apparent 

Distress





ED Treatment Course





- LABORATORY


CBC & Chemistry Diagram: 


                                 20 12:20





                                 20 12:20





- ADDITIONAL ORDERS


Additional order review: 


                                        











  20





  12:20


 


RBC  4.39


 


MCV  88.6


 


MCHC  32.5


 


RDW  12.9


 


MPV  7.4 L


 


Neutrophils %  49.8  D


 


Lymphocytes %  40.8 H D


 


Monocytes %  6.9


 


Eosinophils %  1.8


 


Basophils %  0.7














- RADIOLOGY


Radiology Studies Ordered: 














 Category Date Time Status


 


 DUPLEX VASCUL US-1 LEG [US] Stat Ultrasound  20 12:24 Ordered














Medical Decision Making





- Medical Decision Making





20 12:40


Patient with past medical history of asthma and 7 weeks postpartum  

presented with complaint of persistent pain to posterior right popliteal and 

distal thigh area which is worse when she sitting down which patient described 

as stretching of the muscles of the leg.  Patient went to see PCP for the first 

time to initiate care and was sent by PCP Dr. Sofie Yu to rule out DVT and do

 blood work.  Patient denies any shortness of breath now.  Patient reported has 

been having persistent asthma symptoms throughout her pregnancy which she has 

improved after delivery 6 weeks ago.  Denies shortness of breath now.  Denies 

chest pain, palpitations, numbness or tingling sensation, nausea, vomiting, 

dizziness, abdominal pain.  Denies any other symptoms.  Patient has not taken 

anything for symptoms








Clinical exam unremarkable except moderate tenderness to posterior popliteal and

 distal posterior right thigh.  No calf tenderness to right lower extremity.  

Negative Homans sign.  No swelling to right lower extremity.  No increased 

warmth to calf muscle.  Lungs clear to auscultation bilateral patient in no 

acute respiratory distress.  Normal cardio exam.





Patient symptoms likely muscle strain versus less likely DVT.


Basic blood work CBC, CMP and coags lab ordered as per PCP request.  Duplex 

ultrasound ordered to rule out DVT.  Patient be discharged home on NSAIDs and 

muscle relaxer if negative DVT with follow-up back with PCP


20 15:19


CBC and chemistry lab unremarkable and coags lab normal.  Checks x-ray shows no 

acute abnormality.  Duplex ultrasound shows no DVT.  Patient symptoms likely 

muscle cramps and stable for discharge and ibuprofen PRN for pain and Robaxin 

for spasm and advised to do hot compresses with PCP follow-up





Discharge





- Discharge Information


Problems reviewed: Yes


Clinical Impression/Diagnosis: 


 Cramps of right lower extremity





Condition: Stable


Disposition: HOME





- Admission


No





- Additional Discharge Information


Prescriptions: 


Ibuprofen 800 mg PO Q8H PRN #20 tablet


 PRN Reason: pain


Methocarbamol [Robaxin -] 500 mg PO BID PRN #14 tablet


 PRN Reason: cramps/muscle spasm





- Follow up/Referral


Referrals: 


Layne Cueva MD [Primary Care Provider] - 





- Patient Discharge Instructions


Patient Printed Discharge Instructions:  DI for Nocturnal Leg Cramps


Additional Instructions: 


Your chest x-ray was normal.  Your blood work is normal.  Your ultrasound of 

your leg shows no blood clot.  Your pain is likely from muscle cramps.  Take 

prescribed medication as prescribed for muscle cramps.  Apply hot compresses to 

leg 2-3 times a day as needed for pain.  Follow-up with your primary care





- Post Discharge Activity

## 2020-09-05 ENCOUNTER — HOSPITAL ENCOUNTER (EMERGENCY)
Dept: HOSPITAL 74 - JER | Age: 35
Discharge: HOME | End: 2020-09-05
Payer: COMMERCIAL

## 2020-09-05 VITALS — TEMPERATURE: 98.7 F | HEART RATE: 50 BPM | DIASTOLIC BLOOD PRESSURE: 67 MMHG | SYSTOLIC BLOOD PRESSURE: 112 MMHG

## 2020-09-05 VITALS — BODY MASS INDEX: 31.3 KG/M2

## 2020-09-05 DIAGNOSIS — M54.31: Primary | ICD-10-CM

## 2020-09-05 DIAGNOSIS — M71.21: ICD-10-CM

## 2020-09-05 NOTE — PDOC
History of Present Illness





- General


Chief Complaint: Revisit,Radiology Variance


Stated Complaint: SENT BY PCP


Time Seen by Provider: 20 07:42


History Source: Patient


Exam Limitations: No Limitations





- History of Present Illness


Initial Comments: 





20 09:20


31F with PMH of asthma and ~8 weeks postpartum with  presents with 

right leg tenderness. She reported it started shortly after the , and 

worsened about 2 weeks ago. She was seen here last week, U/S was done, and 

inconclusive, and subsequently discharged. She reports continued pain in the 

right lower leg and thigh that's worse with movement, is constant. She also 

reports constant long-term low back pain and paresthesia at anterior right lower

leg, without weakness. Denies radiation of low back pain to right leg. Denies 

hx/o DVT or PE. Denies lightheadedness, chest pain, SOB, or LE edema. 





PMH: as in HPI


SH: see below


Meds: ibuprofen


Allergies: NKDA


Tob/Etoh/Rec drugs: neg x3





PCP: 





ROS


GENERAL/CONSTITUTIONAL: No fever or chills. No weakness.


HEENT: No change in vision. No ear pain or discharge. No sore throat.


CARDIOVASCULAR: No chest pain or shortness of breath


RESPIRATORY: No cough, wheezing, or hemoptysis.


GASTROINTESTINAL: No nausea, vomiting, diarrhea or constipation.


GENITOURINARY: No dysuria, frequency, or change in urination.


MUSCULOSKELETAL: No joint or muscle swelling or pain. No neck or back pain.


SKIN: No rash


NEUROLOGIC: No headache, vertigo, loss of consciousness, or change in 

strength/sensation.


ENDOCRINE: No increased thirst. No abnormal weight change


HEMATOLOGIC/LYMPHATIC: No anemia, easy bleeding, or history of blood clots.


ALLERGIC/IMMUNOLOGIC: No hives or skin allergy.





PE


GENERAL: Awake, alert, and fully oriented; no acute distress


HEAD: No signs of trauma, normocephalic, atraumatic 


EYES: PERRLA, EOMI, sclera anicteric, conjunctiva clear


ENT: Auricles normal inspection, hearing grossly normal, nares patent, moist 

mucosa, oropharynx clear without exudates.


NECK: Normal ROM, supple, no LAD, JVD, or masses


HEART: Regular rate and rhythm, normal S1/S2, no murmurs, rubs or gallops, 

peripheral pulses normal and equal bilaterally. 


LUNGS: No distress, speaks full sentences, clear to auscultation bilaterally


ABDOMEN: Soft, nontender. No guarding, no rebound. No masses


EXTREMITIES: no edema or erythema, + tenderness to palpation of posterior right 

distal thigh and posterior calf. + Guille sign


NEUROLOGICAL: CNII-XII grossly intact. Normal speech, no focal sensorimotor 

deficits 


SKIN: Warm, Dry, normal turgor, no rashes or lesions noted





Assessment and Plan


1. DVT - moderate risk Well's score for DVT


2. Sciatica 








Marlon Menon, PGY1


Emergency Medicine











Past History





- Medical History


Allergies/Adverse Reactions: 


                                    Allergies











Allergy/AdvReac Type Severity Reaction Status Date / Time


 


No Known Drug Allergies Allergy   Verified 20 11:56


 


spices Allergy Mild Difficulty Uncoded 20 11:56





   Breathing  











Home Medications: 


Ambulatory Orders





Ferrous Sulfate [Feosol] 325 mg PO DAILY 20 


Mv-Mn/Iron/FA/Herbal/Digestive [Prenatal One Tablet] 1 each PO DAILY 20 


Ibuprofen 800 mg PO Q8H PRN #20 tablet 20 


Methocarbamol [Robaxin -] 500 mg PO BID PRN #14 tablet 20 








Asthma: Yes


Cancer: No


Cardiac Disorders: No


COPD: No


Diabetes: No


HTN: No


Seizures: No


Thyroid Disease: No





- Reproductive History


Is Patient Pregnant Now?: No





- Immunization History


Immunization Up to Date: Yes





- Psycho-Social/Smoking History


Smoking History: Never smoked





- Substance Abuse Hx (Audit-C & DAST Scrn)


How often the patient has a drink containing alcohol: Never


Score: In Men: 4 or > Positive; In Women: 3 or > Positive: 0


Screen Result (Pos requires Nsg. Audit-10AR): Negative





*Physical Exam





- Vital Signs


                                Last Vital Signs











Temp Pulse Resp BP Pulse Ox


 


 98.2 F   70   18   127/92   100 


 


 20 07:20  20 07:20  20 07:20  20 07:20  20 07:20














Medical Decision Making





- Medical Decision Making





20 09:51


31F with PMH of asthma and ~8 weeks postpartum with  presents with 

right leg pain. 


Exam demonstrated right posterior calf and thigh tenderness to palpation, 

without swelling or erythema. 


Moderate risk Well's score for DVT.





DDx:


- DVT - tenderness at calf,  8 weeks ago


- sciatica - complains of low back pain with occasional radiation of posterior 

right leg, + straight leg test


- SI joint inflammation - negative GABRIELLE test, unlikely





Seen in ED on .


Lower ext doppler US () did not find evidence of DVT but was unable to 

visualize second right posterior tibial vein -- exam inconclusive. No evidence 

of Baker's cyst. 





Given 600mg motrin.





Lower ext doppler US () - negative for DVT; demonstrated Baker's cyst. 





DVT unlikely, clinically correlates with Baker's cyst on reevaluation. 


Pt safe for discharge, advised to take motrin/tylenol at home, and to follow up 

with PMD.














Discharge





- Discharge Information


Problems reviewed: Yes


Clinical Impression/Diagnosis: 


Sciatica


Qualifiers:


 Laterality: right Qualified Code(s): M54.31 - Sciatica, right side





Baker cyst


Qualifiers:


 Laterality: right Qualified Code(s): M71.21 - Synovial cyst of popliteal space 

[Ramos], right knee





Condition: Improved


Disposition: HOME





- Admission


No





- Follow up/Referral


Referrals: 


Layne Cueva MD [Primary Care Provider] - 





- Patient Discharge Instructions


Patient Printed Discharge Instructions:  DI for Baker's Cyst


Additional Instructions: 


You were seen in the ED for complaints of right lower leg pain.





In the ED you were evaluated with Doppler Ultrasound of the right lower leg.


Your results did not show evidence of blood clot or DVT, but showed Baker cyst. 


There does not appear to be an acute need for immediate hospitalization.





You are advised to follow up with your Primary Care Physician within 1 week.


Use Motrin, advil, or tylenol for pain control as needed. 





Return to the ED immediately if you experience pain and swelling in the leg, 

shortness of breath, chest pain or passing out. 














- Post Discharge Activity

## 2020-09-05 NOTE — PDOC
Documentation entered by David Nice SCRIBE, acting as scribe for 

Sherry Damon MD.








Sherry Damon MD:  This documentation has been prepared by the Tex gallo Xhesika, SCRIBE, under my direction and personally reviewed by me in its 

entirety.  I confirm that the documentation accurately reflects all work, 

treatment, procedures, and medical decision making performed by me.  





Attending Attestation





- Resident


Resident Name: Marlon Menon





- ED Attending Attestation


I have performed the following: I have examined & evaluated the patient, The 

case was reviewed & discussed with the resident, I agree w/resident's findings &

plan, Exceptions are as noted





- HPI


HPI: 





20 09:28


34y/o F with a past medical history of asthma and 8 weeks postpartum ()

who presents to the ED with complaint of persistent R leg pain and tenderness x 

several weeks.  Patient was sent to the ED by PCP Dr. Sofie Cueva to rule out DVT

and further evaluation. Pt was seen here on 20 for similar symptoms, US was

negative and was dxc. Pt denies hx/o DVT or PE. Denies chest pain, palpitations,

numbness or LE edema. Denies nausea, vomiting, dizziness, abdominal pain.  

Denies any other symptoms. c/o low back pain. sometimes radiating down back leg.

 also c/o posterior thigh pain, pain relieved with lying flat. no f/c no 

numbness or weakness. taking ibuprofen mild relief. no kaleigh or bladder 

incontinence. 





Allergies: NKDA


PCP: Sofie Rivera 





20 10:43








- Physicial Exam


PE: 





20 10:44


awake alert lungs clear bilat heart rrr no mrg abd soft nt nd ext wwp. positive 

straight leg right leg. sensatio intact bilat lower ext. no midline spinal 

tenderness. nuero strength 5/5 bilat lower ext. 





- Medical Decision Making





20 10:44


36 yo post partum 8 weeks with low back pain posterior thigh leg pain, postiive 

straight leg raise.


plan doppler due to last us unclear visualization of the popliteal v. motrin. 


us negative for dvt.


motrin for pain. likley sciatica and low back strain from new mother leaning and

lifting. 





Discharge





- Discharge Information


Problems reviewed: Yes


Clinical Impression/Diagnosis: 


 Sciatica





Condition: Improved


Disposition: HOME





- Admission


No





- Follow up/Referral


Referrals: 


Layne Cueva MD [Primary Care Provider] - 





- Patient Discharge Instructions





- Post Discharge Activity

## 2021-02-26 ENCOUNTER — HOSPITAL ENCOUNTER (OUTPATIENT)
Dept: HOSPITAL 74 - JASU-SURG | Age: 36
Discharge: HOME | End: 2021-02-26
Attending: STUDENT IN AN ORGANIZED HEALTH CARE EDUCATION/TRAINING PROGRAM
Payer: COMMERCIAL

## 2021-02-26 VITALS — TEMPERATURE: 97.9 F | DIASTOLIC BLOOD PRESSURE: 76 MMHG | HEART RATE: 78 BPM | SYSTOLIC BLOOD PRESSURE: 119 MMHG

## 2021-02-26 VITALS — BODY MASS INDEX: 30.9 KG/M2

## 2021-02-26 DIAGNOSIS — M47.816: Primary | ICD-10-CM

## 2021-02-26 PROCEDURE — 3E0T3BZ INTRODUCTION OF ANESTHETIC AGENT INTO PERIPHERAL NERVES AND PLEXI, PERCUTANEOUS APPROACH: ICD-10-PCS | Performed by: STUDENT IN AN ORGANIZED HEALTH CARE EDUCATION/TRAINING PROGRAM

## 2021-02-26 PROCEDURE — BR16YZZ FLUOROSCOPY OF LUMBAR FACET JOINT(S) USING OTHER CONTRAST: ICD-10-PCS | Performed by: STUDENT IN AN ORGANIZED HEALTH CARE EDUCATION/TRAINING PROGRAM

## 2022-07-05 ENCOUNTER — HOSPITAL ENCOUNTER (EMERGENCY)
Dept: HOSPITAL 74 - JERFT | Age: 37
Discharge: HOME | End: 2022-07-05
Payer: COMMERCIAL

## 2022-07-05 VITALS — DIASTOLIC BLOOD PRESSURE: 74 MMHG | HEART RATE: 76 BPM | SYSTOLIC BLOOD PRESSURE: 111 MMHG | TEMPERATURE: 98.5 F

## 2022-07-05 VITALS — BODY MASS INDEX: 29.7 KG/M2

## 2022-07-05 DIAGNOSIS — M79.641: Primary | ICD-10-CM

## 2023-03-20 ENCOUNTER — HOSPITAL ENCOUNTER (EMERGENCY)
Dept: HOSPITAL 74 - JERFT | Age: 38
Discharge: HOME | End: 2023-03-20
Payer: COMMERCIAL

## 2023-03-20 VITALS
HEART RATE: 67 BPM | DIASTOLIC BLOOD PRESSURE: 81 MMHG | SYSTOLIC BLOOD PRESSURE: 119 MMHG | TEMPERATURE: 98 F | RESPIRATION RATE: 18 BRPM

## 2023-03-20 VITALS — BODY MASS INDEX: 69.7 KG/M2
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
## 2023-03-20 DIAGNOSIS — S69.92XA: Primary | ICD-10-CM
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
## 2023-09-07 ENCOUNTER — OFFICE (OUTPATIENT)
Dept: URBAN - METROPOLITAN AREA CLINIC 30 | Facility: CLINIC | Age: 38
Setting detail: OPHTHALMOLOGY
End: 2023-09-07
Payer: MEDICAID

## 2023-09-07 DIAGNOSIS — H01.004: ICD-10-CM

## 2023-09-07 DIAGNOSIS — H16.223: ICD-10-CM

## 2023-09-07 DIAGNOSIS — H01.001: ICD-10-CM

## 2023-09-07 PROCEDURE — 92004 COMPRE OPH EXAM NEW PT 1/>: CPT | Performed by: OPHTHALMOLOGY

## 2023-09-07 ASSESSMENT — CONFRONTATIONAL VISUAL FIELD TEST (CVF)
OD_FINDINGS: FULL
OS_FINDINGS: FULL

## 2023-09-07 ASSESSMENT — TONOMETRY
OS_IOP_MMHG: 13
OD_IOP_MMHG: 13

## 2023-09-07 ASSESSMENT — REFRACTION_AUTOREFRACTION
OD_SPHERE: -0.25
OS_SPHERE: PLANO
OS_CYLINDER: +0.25
OS_AXIS: 55

## 2023-09-07 ASSESSMENT — VISUAL ACUITY
OD_BCVA: 20/20
OS_BCVA: 20/25-1

## 2023-09-07 ASSESSMENT — LID EXAM ASSESSMENTS
OD_BLEPHARITIS: RUL 1+
OS_BLEPHARITIS: LUL 1+

## 2023-09-07 ASSESSMENT — TEAR BREAK UP TIME (TBUT)
OD_TBUT: 2+ 3+
OS_TBUT: 2+ 3+

## 2024-11-03 ENCOUNTER — HOSPITAL ENCOUNTER (EMERGENCY)
Dept: HOSPITAL 74 - JER | Age: 39
LOS: 1 days | Discharge: HOME | End: 2024-11-04
Payer: COMMERCIAL

## 2024-11-03 VITALS
TEMPERATURE: 98.5 F | RESPIRATION RATE: 18 BRPM | HEART RATE: 70 BPM | DIASTOLIC BLOOD PRESSURE: 68 MMHG | SYSTOLIC BLOOD PRESSURE: 111 MMHG

## 2024-11-03 VITALS — BODY MASS INDEX: 34 KG/M2

## 2024-11-03 DIAGNOSIS — K08.89: ICD-10-CM

## 2024-11-03 DIAGNOSIS — R68.84: Primary | ICD-10-CM

## 2024-11-03 PROCEDURE — 3E0233Z INTRODUCTION OF ANTI-INFLAMMATORY INTO MUSCLE, PERCUTANEOUS APPROACH: ICD-10-PCS

## 2024-11-03 RX ADMIN — ACETAMINOPHEN ONE MG: 500 TABLET, FILM COATED ORAL at 23:29

## 2024-11-03 RX ADMIN — BENZOCAINE ONE APPLIC: 200 GEL TOPICAL at 23:35

## 2024-11-03 RX ADMIN — KETOROLAC TROMETHAMINE ONE MG: 30 INJECTION INTRAMUSCULAR; INTRAVENOUS at 23:38
